# Patient Record
Sex: FEMALE | Race: BLACK OR AFRICAN AMERICAN | NOT HISPANIC OR LATINO | URBAN - METROPOLITAN AREA
[De-identification: names, ages, dates, MRNs, and addresses within clinical notes are randomized per-mention and may not be internally consistent; named-entity substitution may affect disease eponyms.]

---

## 2017-02-15 PROBLEM — Z00.00 ENCOUNTER FOR PREVENTIVE HEALTH EXAMINATION: Status: ACTIVE | Noted: 2017-02-15

## 2017-09-26 ENCOUNTER — EMERGENCY (EMERGENCY)
Facility: HOSPITAL | Age: 22
LOS: 1 days | Discharge: HOME | End: 2017-09-26
Admitting: EMERGENCY MEDICINE
Payer: COMMERCIAL

## 2017-09-26 VITALS
DIASTOLIC BLOOD PRESSURE: 82 MMHG | OXYGEN SATURATION: 98 % | SYSTOLIC BLOOD PRESSURE: 139 MMHG | TEMPERATURE: 98 F | RESPIRATION RATE: 18 BRPM | HEART RATE: 104 BPM

## 2017-09-26 DIAGNOSIS — R69 ILLNESS, UNSPECIFIED: ICD-10-CM

## 2017-09-26 DIAGNOSIS — F12.150 CANNABIS ABUSE WITH PSYCHOTIC DISORDER WITH DELUSIONS: ICD-10-CM

## 2017-09-26 LAB
ALBUMIN SERPL ELPH-MCNC: 4.6 G/DL — SIGNIFICANT CHANGE UP (ref 3.3–5)
ALP SERPL-CCNC: 56 U/L — SIGNIFICANT CHANGE UP (ref 40–120)
ALT FLD-CCNC: 11 U/L — SIGNIFICANT CHANGE UP (ref 4–33)
APAP SERPL-MCNC: < 15 UG/ML — LOW (ref 15–25)
AST SERPL-CCNC: 24 U/L — SIGNIFICANT CHANGE UP (ref 4–32)
BARBITURATES MEASUREMENT: NEGATIVE — SIGNIFICANT CHANGE UP
BASOPHILS # BLD AUTO: 0.02 K/UL — SIGNIFICANT CHANGE UP (ref 0–0.2)
BASOPHILS NFR BLD AUTO: 0.4 % — SIGNIFICANT CHANGE UP (ref 0–2)
BENZODIAZ SERPL-MCNC: NEGATIVE — SIGNIFICANT CHANGE UP
BILIRUB SERPL-MCNC: 1.1 MG/DL — SIGNIFICANT CHANGE UP (ref 0.2–1.2)
BUN SERPL-MCNC: 8 MG/DL — SIGNIFICANT CHANGE UP (ref 7–23)
CALCIUM SERPL-MCNC: 9.1 MG/DL — SIGNIFICANT CHANGE UP (ref 8.4–10.5)
CHLORIDE SERPL-SCNC: 100 MMOL/L — SIGNIFICANT CHANGE UP (ref 98–107)
CO2 SERPL-SCNC: 19 MMOL/L — LOW (ref 22–31)
CREAT SERPL-MCNC: 1.16 MG/DL — SIGNIFICANT CHANGE UP (ref 0.5–1.3)
EOSINOPHIL # BLD AUTO: 0.02 K/UL — SIGNIFICANT CHANGE UP (ref 0–0.5)
EOSINOPHIL NFR BLD AUTO: 0.4 % — SIGNIFICANT CHANGE UP (ref 0–6)
ETHANOL BLD-MCNC: < 10 MG/DL — SIGNIFICANT CHANGE UP
GLUCOSE SERPL-MCNC: 117 MG/DL — HIGH (ref 70–99)
HCG SERPL-ACNC: < 5 MIU/ML — SIGNIFICANT CHANGE UP
HCT VFR BLD CALC: 39.2 % — SIGNIFICANT CHANGE UP (ref 34.5–45)
HGB BLD-MCNC: 13.7 G/DL — SIGNIFICANT CHANGE UP (ref 11.5–15.5)
IMM GRANULOCYTES # BLD AUTO: 0.01 # — SIGNIFICANT CHANGE UP
IMM GRANULOCYTES NFR BLD AUTO: 0.2 % — SIGNIFICANT CHANGE UP (ref 0–1.5)
LYMPHOCYTES # BLD AUTO: 1.4 K/UL — SIGNIFICANT CHANGE UP (ref 1–3.3)
LYMPHOCYTES # BLD AUTO: 27.3 % — SIGNIFICANT CHANGE UP (ref 13–44)
MCHC RBC-ENTMCNC: 32.2 PG — SIGNIFICANT CHANGE UP (ref 27–34)
MCHC RBC-ENTMCNC: 34.9 % — SIGNIFICANT CHANGE UP (ref 32–36)
MCV RBC AUTO: 92.2 FL — SIGNIFICANT CHANGE UP (ref 80–100)
MONOCYTES # BLD AUTO: 0.54 K/UL — SIGNIFICANT CHANGE UP (ref 0–0.9)
MONOCYTES NFR BLD AUTO: 10.5 % — SIGNIFICANT CHANGE UP (ref 2–14)
NEUTROPHILS # BLD AUTO: 3.13 K/UL — SIGNIFICANT CHANGE UP (ref 1.8–7.4)
NEUTROPHILS NFR BLD AUTO: 61.2 % — SIGNIFICANT CHANGE UP (ref 43–77)
NRBC # FLD: 0 — SIGNIFICANT CHANGE UP
PLATELET # BLD AUTO: 262 K/UL — SIGNIFICANT CHANGE UP (ref 150–400)
PMV BLD: 9.3 FL — SIGNIFICANT CHANGE UP (ref 7–13)
POTASSIUM SERPL-MCNC: 3.2 MMOL/L — LOW (ref 3.5–5.3)
POTASSIUM SERPL-SCNC: 3.2 MMOL/L — LOW (ref 3.5–5.3)
PROT SERPL-MCNC: 7.1 G/DL — SIGNIFICANT CHANGE UP (ref 6–8.3)
RBC # BLD: 4.25 M/UL — SIGNIFICANT CHANGE UP (ref 3.8–5.2)
RBC # FLD: 12.7 % — SIGNIFICANT CHANGE UP (ref 10.3–14.5)
SALICYLATES SERPL-MCNC: < 5 MG/DL — LOW (ref 15–30)
SODIUM SERPL-SCNC: 140 MMOL/L — SIGNIFICANT CHANGE UP (ref 135–145)
TSH SERPL-MCNC: 0.6 UIU/ML — SIGNIFICANT CHANGE UP (ref 0.27–4.2)
WBC # BLD: 5.12 K/UL — SIGNIFICANT CHANGE UP (ref 3.8–10.5)
WBC # FLD AUTO: 5.12 K/UL — SIGNIFICANT CHANGE UP (ref 3.8–10.5)

## 2017-09-26 PROCEDURE — 70450 CT HEAD/BRAIN W/O DYE: CPT | Mod: 26

## 2017-09-26 PROCEDURE — 93010 ELECTROCARDIOGRAM REPORT: CPT | Mod: 59

## 2017-09-26 PROCEDURE — 99285 EMERGENCY DEPT VISIT HI MDM: CPT | Mod: 25

## 2017-09-26 RX ORDER — POTASSIUM CHLORIDE 20 MEQ
40 PACKET (EA) ORAL ONCE
Qty: 0 | Refills: 0 | Status: COMPLETED | OUTPATIENT
Start: 2017-09-26 | End: 2017-09-26

## 2017-09-26 RX ORDER — HALOPERIDOL DECANOATE 100 MG/ML
5 INJECTION INTRAMUSCULAR ONCE
Qty: 0 | Refills: 0 | Status: COMPLETED | OUTPATIENT
Start: 2017-09-26 | End: 2017-09-26

## 2017-09-26 RX ORDER — DIPHENHYDRAMINE HCL 50 MG
50 CAPSULE ORAL ONCE
Qty: 0 | Refills: 0 | Status: COMPLETED | OUTPATIENT
Start: 2017-09-26 | End: 2017-09-26

## 2017-09-26 RX ADMIN — Medication 2 MILLIGRAM(S): at 20:39

## 2017-09-26 RX ADMIN — HALOPERIDOL DECANOATE 5 MILLIGRAM(S): 100 INJECTION INTRAMUSCULAR at 20:39

## 2017-09-26 RX ADMIN — Medication 50 MILLIGRAM(S): at 20:39

## 2017-09-26 NOTE — ED BEHAVIORAL HEALTH ASSESSMENT NOTE - SUMMARY
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 Pt was extremely agitated and combative upon arrival and was medicated with Haldol 5mg, Ativan 2mg, and Benadryl 50mg IM STAT with good effect. Pt is now calm and cooperative. Pt denies smoking K2 and says "my parents are confused." She says "let me go. I want to go back to school." She denies mood or psychotic symptoms, including depressed mood, hopelessness, helplessness, racing thoughts, A/V hallucinations, delusions and paranoia. Pt also specifically and adamantly denies suicidal and homicidal ideations, intent or plan at this time. I met with Pt's mother and father who stated they went to see the Pt on the weekend at [place] and Pt was at [facility] for psych. Friend told parents that Pt was acting strange, quoting Bible, preaching that Rodney is coming, and saying she will be quitting school. So parents brought Pt back home to [place]. This morning, while they were driving back to [place], [place], Pt admitted to parents that she had smoked weed on Saturday, so parents made a U-turn and brought her to our ER. 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    Pt re-evaluated after IM medications and sufficient time in the ER. Pt states that over this past weekend she smoked some marijuana and was speaking to her friends about her Anglican (Jainism) in parables when they became concerned. Pt states her friends at school did not understand what she was doing, became concerned and called 911. Pt was taken to the [facility] and cleared. Pt's parents were notified by her friends and they came to pick her up. Pt's parents brought her home Monday. Pt states she stayed home Monday and Tuesday with plans to return to school Tuesday evening. While driving back to school, pt admitted to her father that she had smoked weed on Saturday and for that reason he brought her to the hospital. Upon arrival to the ER, pt admits to being combative and resistant as she did not want to be evaluated and felt her parents were being "ridiculous". Pt denies any s/s of Depression, yari, or psychosis. Denies suicidal or homicidal ideations, intent or plans. Denies auditory/visual hallucinations or delusions, none elicited on exam. Endorses adequate sleep and appetite. Pt states she is Yazidi her whole life and that her recent Yazidi conversations do not indicate a hyper-religiosity. Pt states she enjoys school and wants to work in psychiatry. Pt denies any regular substance use, states she only rarely will take 1-2 puffs of marijuana if others are smoking. Pt declined need for inpatient psychiatric treatment and there was no indication for involuntary admission at this time as pt is not an acute risk of harm to self/others. Pt accepted information regarding [facility] mental health clinic and states she will follow up if needed. Spoke with pt's parents and they agree with plan for discharge, have no acute safety concerns.

## 2017-09-26 NOTE — ED BEHAVIORAL HEALTH ASSESSMENT NOTE - CASE SUMMARY
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    Pt re-evaluated after IM medications and sufficient time in the ER. Pt states that over this past weekend she smoked some marijuana and was speaking to her friends about her Sabianism (Anabaptism) in parables when they became concerned. Pt states her friends at school did not understand what she was doing, became concerned and called 911. Pt was taken to the [facility] and cleared. Pt's parents were notified by her friends and they came to pick her up. Pt's parents brought her home Monday. Pt states she stayed home Monday and Tuesday with plans to return to school Tuesday evening. While driving back to school, pt admitted to her father that she had smoked weed on Saturday and for that reason he brought her to the hospital. Upon arrival to the ER, pt admits to being combative and resistant as she did not want to be evaluated and felt her parents were being "ridiculous". Pt denies any s/s of Depression, yari, or psychosis. Denies suicidal or homicidal ideations, intent or plans. Denies auditory/visual hallucinations or delusions, none elicited on exam. Endorses adequate sleep and appetite. 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 Pt states she enjoys school and wants to work in psychiatry. Pt denies any regular substance use, states she only rarely will take 1-2 puffs of marijuana if others are smoking. Pt declined need for inpatient psychiatric treatment and there was no indication for involuntary admission at this time as pt is not an acute risk of harm to self/others. Pt accepted information regarding [facility] mental health clinic and states she will follow up if needed. Spoke with pt's parents and they agree with plan for discharge, have no acute safety concerns.

## 2017-09-26 NOTE — ED PROVIDER NOTE - OBJECTIVE STATEMENT
This is a 22 year old Female No PMHX No PSH BIB family for psych eval. Patient was aggressive to triage staff required immediate intervention with Ativan 2mg/Haldol 5mg/Benadryl 50 mg IM x 1 and restraints. Patient was yelling, running, attempting to bite and push staff. Patient was stating " you may call me "your sugey of water" "You are not in my kingdom" " I will kill you in 5 minutes" "I can make it happen"

## 2017-09-26 NOTE — ED BEHAVIORAL HEALTH NOTE - BEHAVIORAL HEALTH NOTE
Patient is a 22 year female student brought in to the emergency room by her parents.  Writer met with pt's parents mother Yolette Solomon  father Monday Neha .  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  Patient's mother states she went to pick patient up yesterday because she received a call from patient's friend that patient was preaching.  Patient's parents state patient typically preaches, but this time it was "excessive" and "out of line".  Patient was reportedly saying "give your life to Herbie" and "Herbie is coming soon".  Patient slept well last night and her parents were driving her back to [place] today, but started yelling "Call ".  Patient's parents then decided to bring patient to the hospital.  Patient has no psychiatric history, no major medical problems, some asthma and penicillin allergy.  Patient has no medical admissions, no history of surgery.  Patient has no family history of mental illness.  They state patient reported to them smoking Marijuana on Saturday.

## 2017-09-26 NOTE — ED BEHAVIORAL HEALTH ASSESSMENT NOTE - HPI (INCLUDE ILLNESS QUALITY, SEVERITY, DURATION, TIMING, CONTEXT, MODIFYING FACTORS, ASSOCIATED SIGNS AND SYMPTOMS)
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 Pt was extremely agitated and combative upon arrival and was medicated with Haldol 5mg, Ativan 2mg, and Benadryl 50mg IM STAT with good effect. Pt is now calm and cooperative. Pt denies smoking K2 and says "my parents are confused." She says "let me go. I want to go back to school." She denies mood or psychotic symptoms, including depressed mood, hopelessness, helplessness, racing thoughts, A/V hallucinations, delusions and paranoia. Pt also specifically and adamantly denies suicidal and homicidal ideations, intent or plan at this time. I met with Pt's mother and father who stated they went to see the Pt on the weekend at [place] and Pt was at [facility] for psych. Friend told parents that Pt was acting strange, quoting Bible, preaching that Rodney is coming, and saying she will be quitting school. So parents brought Pt back home to [place]. This morning, while they were driving back to [place], [place], Pt admitted to parents that she had smoked weed on Saturday, so parents made a U-turn and brought her to our ER. 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 Pt was extremely agitated and combative upon arrival and was medicated with Haldol 5mg, Ativan 2mg, and Benadryl 50mg IM STAT. Pt denies smoking K2 and says "my parents are confused." She says "let me go. I want to go back to school." She denies mood or psychotic symptoms, including depressed mood, hopelessness, helplessness, racing thoughts, A/V hallucinations, delusions and paranoia. Pt also specifically and adamantly denies suicidal and homicidal ideations, intent or plan at this time. I met with Pt's mother and father who stated they went to see the Pt on the weekend at [place] and Pt was at [facility] for psych. Friend told parents that Pt was acting strange, quoting Bible, preaching that Rodney is coming, and saying she will be quitting school. So parents brought Pt back home to [place]. This morning, while they were driving back to [place], [place], Pt admitted to parents that she had smoked weed on Saturday, so parents made a U-turn and brought her to our ER. Father states he was aware that Pt has been smoking marijuana time to time but he has "never seen her like this before."    Pt to be re-evaluated s/p IM medications - see Summary

## 2017-09-26 NOTE — ED ADULT NURSE REASSESSMENT NOTE - NS ED NURSE REASSESS COMMENT FT1
Patient taken out of 4 point leather restraints at 17:30 once noted to be calm and sleeping, blood work and EKG done, pt currently in BH room 6, awaiting further MD evaluation, will continue to monitor pt.

## 2017-09-26 NOTE — ED BEHAVIORAL HEALTH ASSESSMENT NOTE - PRIMARY DX
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

## 2017-09-26 NOTE — ED ADULT TRIAGE NOTE - CHIEF COMPLAINT QUOTE
Patient BIB family for psychotic episode and agitation, pt unable to be triaged, pt became combative with triage nurse

## 2017-09-26 NOTE — ED BEHAVIORAL HEALTH NOTE - BEHAVIORAL HEALTH NOTE
This worker has met with the patient's family to notify them and provide visiting information for the [address] [place] [address].

## 2017-09-26 NOTE — ED BEHAVIORAL HEALTH ASSESSMENT NOTE - SUICIDE PROTECTIVE FACTORS
Supportive social network or family/Engaged in work or school/Identifies reasons for living/Responsibility to family and others

## 2017-09-26 NOTE — ED PROVIDER NOTE - CARE PLAN
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

## 2017-09-27 VITALS
HEART RATE: 74 BPM | RESPIRATION RATE: 16 BRPM | TEMPERATURE: 98 F | OXYGEN SATURATION: 100 % | SYSTOLIC BLOOD PRESSURE: 118 MMHG | DIASTOLIC BLOOD PRESSURE: 71 MMHG

## 2017-09-27 DIAGNOSIS — F12.950 CANNABIS USE, UNSPECIFIED WITH PSYCHOTIC DISORDER WITH DELUSIONS: ICD-10-CM

## 2017-09-27 DIAGNOSIS — F43.24 ADJUSTMENT DISORDER WITH DISTURBANCE OF CONDUCT: ICD-10-CM

## 2019-11-29 ENCOUNTER — INPATIENT (INPATIENT)
Facility: HOSPITAL | Age: 24
LOS: 18 days | Discharge: HOME | End: 2019-12-18
Attending: PSYCHIATRY & NEUROLOGY | Admitting: PSYCHIATRY & NEUROLOGY
Payer: COMMERCIAL

## 2019-11-29 VITALS
DIASTOLIC BLOOD PRESSURE: 83 MMHG | RESPIRATION RATE: 17 BRPM | OXYGEN SATURATION: 100 % | SYSTOLIC BLOOD PRESSURE: 154 MMHG | HEART RATE: 95 BPM | TEMPERATURE: 100 F

## 2019-11-29 DIAGNOSIS — F23 BRIEF PSYCHOTIC DISORDER: ICD-10-CM

## 2019-11-29 DIAGNOSIS — F29 UNSPECIFIED PSYCHOSIS NOT DUE TO A SUBSTANCE OR KNOWN PHYSIOLOGICAL CONDITION: ICD-10-CM

## 2019-11-29 LAB
ALBUMIN SERPL ELPH-MCNC: 4.8 G/DL — SIGNIFICANT CHANGE UP (ref 3.3–5)
ALP SERPL-CCNC: 71 U/L — SIGNIFICANT CHANGE UP (ref 40–120)
ALT FLD-CCNC: 12 U/L — SIGNIFICANT CHANGE UP (ref 4–33)
ANION GAP SERPL CALC-SCNC: 17 MMO/L — HIGH (ref 7–14)
APAP SERPL-MCNC: < 15 UG/ML — LOW (ref 15–25)
AST SERPL-CCNC: 24 U/L — SIGNIFICANT CHANGE UP (ref 4–32)
BASOPHILS # BLD AUTO: 0.03 K/UL — SIGNIFICANT CHANGE UP (ref 0–0.2)
BASOPHILS NFR BLD AUTO: 0.3 % — SIGNIFICANT CHANGE UP (ref 0–2)
BILIRUB SERPL-MCNC: 0.5 MG/DL — SIGNIFICANT CHANGE UP (ref 0.2–1.2)
BUN SERPL-MCNC: 5 MG/DL — LOW (ref 7–23)
CALCIUM SERPL-MCNC: 9.7 MG/DL — SIGNIFICANT CHANGE UP (ref 8.4–10.5)
CHLORIDE SERPL-SCNC: 105 MMOL/L — SIGNIFICANT CHANGE UP (ref 98–107)
CO2 SERPL-SCNC: 17 MMOL/L — LOW (ref 22–31)
CREAT SERPL-MCNC: 0.96 MG/DL — SIGNIFICANT CHANGE UP (ref 0.5–1.3)
EOSINOPHIL # BLD AUTO: 0.01 K/UL — SIGNIFICANT CHANGE UP (ref 0–0.5)
EOSINOPHIL NFR BLD AUTO: 0.1 % — SIGNIFICANT CHANGE UP (ref 0–6)
ETHANOL BLD-MCNC: < 10 MG/DL — SIGNIFICANT CHANGE UP
GLUCOSE SERPL-MCNC: 111 MG/DL — HIGH (ref 70–99)
HCG SERPL-ACNC: < 5 MIU/ML — SIGNIFICANT CHANGE UP
HCT VFR BLD CALC: 42.9 % — SIGNIFICANT CHANGE UP (ref 34.5–45)
HGB BLD-MCNC: 15 G/DL — SIGNIFICANT CHANGE UP (ref 11.5–15.5)
IMM GRANULOCYTES NFR BLD AUTO: 0.3 % — SIGNIFICANT CHANGE UP (ref 0–1.5)
LYMPHOCYTES # BLD AUTO: 1.07 K/UL — SIGNIFICANT CHANGE UP (ref 1–3.3)
LYMPHOCYTES # BLD AUTO: 10.9 % — LOW (ref 13–44)
MCHC RBC-ENTMCNC: 32.2 PG — SIGNIFICANT CHANGE UP (ref 27–34)
MCHC RBC-ENTMCNC: 35 % — SIGNIFICANT CHANGE UP (ref 32–36)
MCV RBC AUTO: 92.1 FL — SIGNIFICANT CHANGE UP (ref 80–100)
MONOCYTES # BLD AUTO: 0.84 K/UL — SIGNIFICANT CHANGE UP (ref 0–0.9)
MONOCYTES NFR BLD AUTO: 8.5 % — SIGNIFICANT CHANGE UP (ref 2–14)
NEUTROPHILS # BLD AUTO: 7.87 K/UL — HIGH (ref 1.8–7.4)
NEUTROPHILS NFR BLD AUTO: 79.9 % — HIGH (ref 43–77)
NRBC # FLD: 0 K/UL — SIGNIFICANT CHANGE UP (ref 0–0)
PLATELET # BLD AUTO: 317 K/UL — SIGNIFICANT CHANGE UP (ref 150–400)
PMV BLD: 10 FL — SIGNIFICANT CHANGE UP (ref 7–13)
POTASSIUM SERPL-MCNC: 3.9 MMOL/L — SIGNIFICANT CHANGE UP (ref 3.5–5.3)
POTASSIUM SERPL-SCNC: 3.9 MMOL/L — SIGNIFICANT CHANGE UP (ref 3.5–5.3)
PROT SERPL-MCNC: 8.7 G/DL — HIGH (ref 6–8.3)
RBC # BLD: 4.66 M/UL — SIGNIFICANT CHANGE UP (ref 3.8–5.2)
RBC # FLD: 11.9 % — SIGNIFICANT CHANGE UP (ref 10.3–14.5)
SALICYLATES SERPL-MCNC: < 5 MG/DL — LOW (ref 15–30)
SODIUM SERPL-SCNC: 139 MMOL/L — SIGNIFICANT CHANGE UP (ref 135–145)
TSH SERPL-MCNC: 0.76 UIU/ML — SIGNIFICANT CHANGE UP (ref 0.27–4.2)
WBC # BLD: 9.85 K/UL — SIGNIFICANT CHANGE UP (ref 3.8–10.5)
WBC # FLD AUTO: 9.85 K/UL — SIGNIFICANT CHANGE UP (ref 3.8–10.5)

## 2019-11-29 PROCEDURE — 99285 EMERGENCY DEPT VISIT HI MDM: CPT | Mod: GC

## 2019-11-29 RX ORDER — HALOPERIDOL DECANOATE 100 MG/ML
5 INJECTION INTRAMUSCULAR EVERY 8 HOURS
Refills: 0 | Status: DISCONTINUED | OUTPATIENT
Start: 2019-11-29 | End: 2019-12-18

## 2019-11-29 RX ORDER — HALOPERIDOL DECANOATE 100 MG/ML
5 INJECTION INTRAMUSCULAR ONCE
Refills: 0 | Status: DISCONTINUED | OUTPATIENT
Start: 2019-11-29 | End: 2019-12-18

## 2019-11-29 RX ORDER — RISPERIDONE 4 MG/1
2 TABLET ORAL AT BEDTIME
Refills: 0 | Status: DISCONTINUED | OUTPATIENT
Start: 2019-11-29 | End: 2019-12-02

## 2019-11-29 RX ORDER — DIPHENHYDRAMINE HCL 50 MG
50 CAPSULE ORAL ONCE
Refills: 0 | Status: COMPLETED | OUTPATIENT
Start: 2019-11-29 | End: 2019-11-29

## 2019-11-29 RX ORDER — HALOPERIDOL DECANOATE 100 MG/ML
5 INJECTION INTRAMUSCULAR ONCE
Refills: 0 | Status: COMPLETED | OUTPATIENT
Start: 2019-11-29 | End: 2019-11-29

## 2019-11-29 RX ADMIN — Medication 2 MILLIGRAM(S): at 08:09

## 2019-11-29 RX ADMIN — HALOPERIDOL DECANOATE 5 MILLIGRAM(S): 100 INJECTION INTRAMUSCULAR at 08:08

## 2019-11-29 RX ADMIN — RISPERIDONE 2 MILLIGRAM(S): 4 TABLET ORAL at 21:09

## 2019-11-29 RX ADMIN — Medication 50 MILLIGRAM(S): at 08:08

## 2019-11-29 NOTE — ED BEHAVIORAL HEALTH ASSESSMENT NOTE - SUMMARY
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 K2, seen at [facility] several times for psychosis/bizarre behavior/insomnia, currently prescribed Risperdal 2.5mg qhs by [facility] center, no prior inpatient hospitalizations, no SIB or past SAs, who presents to [facility] ED BIB parents for 5 days of bizarre behavior and 2 days of no sleep. Upon evaluation this AM, patient is disorganized, bizarre, and agitated at times. Interview is limited 2/2 thought disorganization. Patient received IM PRN Haldol 5mg, Ativan 2mg, Benadryl 50mg for agitation. 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 K2, seen at [facility] several times for psychosis/bizarre behavior/insomnia, currently prescribed Risperdal 2.5mg qhs by [facility] center, no prior inpatient hospitalizations, no SIB or past SAs, who presents to [facility] ED BIB parents for 5 days of bizarre behavior and 2 days of no sleep. Upon evaluation this AM, patient is disorganized, bizarre, and agitated at times. Interview is limited 2/2 thought disorganization. Patient received IM PRN Haldol 5mg, Ativan 2mg, Benadryl 50mg for agitation. Patient appears acutely decompensated and requires inpatient admission for safety and stabilization.

## 2019-11-29 NOTE — ED BEHAVIORAL HEALTH ASSESSMENT NOTE - CASE SUMMARY
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 K2, seen at [facility] center several times for psychosis/bizarre behavior/insomnia, currently prescribed Risperdal 2.5mg qhs by [facility] center, no prior inpatient hospitalizations, no SIB or past SAs, who presents to [facility] ED BIB parents for 5 days of bizarre behavior and 2 days of no sleep.     Patient presents as acutely decompensated with questionable medication compliance. 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 Similar presentation occurred in 2017 where she was admitted and currently requires inpatient admission for safety and stabilization.

## 2019-11-29 NOTE — ED ADULT NURSE REASSESSMENT NOTE - NS ED NURSE REASSESS COMMENT FT1
Pt received from main ER #TRA. Pt arrives highly disorganized; unable to participate in RN assessment questions and refusing bloodwork. MD made aware and will reassess.

## 2019-11-29 NOTE — ED PROVIDER NOTE - PHYSICAL EXAMINATION
Gen: Alert, intermittently hyperverbal and agitated  Head: NC, AT,  EOMI, normal lids/conjunctiva  ENT:  normal hearing, patent oropharynx without erythema/exudate, mildly dry mucous membranes  Neck: +supple, no tenderness/meningismus/JVD, +Trachea midline  Chest: no chest wall tenderness, equal chest rise  Pulm: Bilateral BS, normal resp effort, no wheeze/stridor/retractions  CV: RRR, no M/R/G, +dist pulses  Abd: +BS, soft, NT/ND  Rectal: deferred  Mskel: no edema/erythema/cyanosis  Skin: no rash  Neuro: AA, does not answer question of orientation, moves all extremities spontaneously and occasionally follows basic commands

## 2019-11-29 NOTE — ED ADULT NURSE NOTE - CHIEF COMPLAINT QUOTE
Pt [parents bring pt to [facility] reporting pt returned home from college three days ago for break and has not been able to sleep.  Pt has tried Benadryl and Nyquil PM with no success.  Pt has begun speaking bizarrely to parents making strange comments.  Pt denies S/I or H/I.  Pt father reports pt was asking him if he was her father.  Pt has hx of anxiety.  Pt family reports pt does not take any medications on a daily basis.  Pt was able to answer A&O questions correctly but it took her time to answer these questions.  RN asked pt if she smoked anything and she reported marijuana but parents denied this.  Pt sitting with eyes closed and will periodically say "I need more truth," and then repeating "it gets easier."  Pt family states pt is feeling CP.  EKG in triage.

## 2019-11-29 NOTE — ED ADULT NURSE REASSESSMENT NOTE - NS ED NURSE REASSESS COMMENT FT1
Pt lying on bed in nad vs as noted blanket provided emotional reassurance provided safety & comfort measures maintained eval on going.

## 2019-11-29 NOTE — ED BEHAVIORAL HEALTH ASSESSMENT NOTE - RISK ASSESSMENT
Low Acute Suicide Risk patient is acutely decompensated, at baseline is a [employer] student now unable to sleep or function, appears psychotic, mumbling to self, paranoid, distressed, requesting help and assistance and with questionable medication compliance.

## 2019-11-29 NOTE — ED ADULT NURSE REASSESSMENT NOTE - NS ED NURSE REASSESS COMMENT FT1
Pt reassessed by Psychiatric attending and medical attending and medicated as ordered. Pt remains highly disorganized with behavior including but not limited to attempting to elope from the unit, attempting to enter other pts rooms, laying on the floor and kissing floor tiles and rambling disorganized speech with varying tones.

## 2019-11-29 NOTE — ED ADULT NURSE NOTE - OBJECTIVE STATEMENT
Pt brought in by parents who reports she has not slept in 3 days. Unable to get clear story. Pt is staring into space and wont answer questions; other times she responds correctly, as she was able to tell me her full name. She is also rambling, not making sense. She appears to possibly be in a psychosis. Parents state the patient does not use and illegal drugs, and that she has no history of this before. Will continue to monitor

## 2019-11-29 NOTE — ED ADULT NURSE NOTE - INTERVENTIONS DEFINITIONS
Physically safe environment: no spills, clutter or unnecessary equipment/Stretcher in lowest position, wheels locked, appropriate side rails in place/Monitor for mental status changes and reorient to person, place, and time

## 2019-11-29 NOTE — ED BEHAVIORAL HEALTH ASSESSMENT NOTE - OTHER PAST PSYCHIATRIC HISTORY (INCLUDE DETAILS REGARDING ONSET, COURSE OF ILLNESS, INPATIENT/OUTPATIENT TREATMENT)
1 prior [facility] ED visit in 2017 for similar presentation. Seen by [facility], diagnosed with yari vs. panic attacks (?)  Currently prescribed Risperdal

## 2019-11-29 NOTE — ED ADULT NURSE REASSESSMENT NOTE - NS ED NURSE REASSESS COMMENT FT1
Evaluated and cleared by Psych / ER attending for admission pt calm disorganized  EMS activated for transport to [address]  safety & comfort measures maintained eval on going.

## 2019-11-29 NOTE — ED BEHAVIORAL HEALTH ASSESSMENT NOTE - FAMILY DETAILS
lives at college but currently at home with family lives at college in dorm by self but currently at home with family
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
## 2019-11-29 NOTE — ED ADULT NURSE REASSESSMENT NOTE - NS ED NURSE REASSESS COMMENT FT1
0830 am Received  report from night RN SS, pt lying on bed in nad eyes close breathing even & unlabored  safety & comfort measures maintained eval on going.

## 2019-11-29 NOTE — ED PROVIDER NOTE - CLINICAL SUMMARY MEDICAL DECISION MAKING FREE TEXT BOX
23yo F w/ PPH as above, bib parents for eval of bizarre behavior. DDx includes drug induced psychosis vs new diagnosis of underlying schizophrenia or yari/bipolar d/o. Pt became increasingly agitated and disorganized when sent to , required haldol/ativan/benadryl. Will get psych labs and BH eval. Discussed w/ psychiatrist.

## 2019-11-29 NOTE — ED ADULT TRIAGE NOTE - CHIEF COMPLAINT QUOTE
Pt [parents bring pt to [facility] reporting pt returned home from college three days ago for break and has not been able to sleep.  Pt has tried Benadryl and Nyquil PM with no success.  Pt has begun speaking bizarrely to parents making strange comments.  Pt denies S/I or H/I.  Pt father reports pt was asking him if he was her father.  Pt has hx of anxiety.  Pt family reports pt does not take any medications on a daily basis.  Pt was able to answer A&O questions correctly but it took her time to answer these questions.  RN asked pt if she smoked anything and she reported marijuana but parents denied this.  Pt sitting with eyes closed and will periodically say "I need more truth," and then repeating "it gets easier."  Pt family states pt is feeling CP.  EKG in triage.

## 2019-11-29 NOTE — ED BEHAVIORAL HEALTH ASSESSMENT NOTE - OTHER
unsure about level of compliance with Risperdal. unsure about recent cannabis use unable to assess appears to be internally preoccupied sharp decline from baseline [id number]

## 2019-11-29 NOTE — ED BEHAVIORAL HEALTH ASSESSMENT NOTE - SUICIDE PROTECTIVE FACTORS
Supportive social network of family or friends/Positive therapeutic relationships/Engaged in work or school

## 2019-11-29 NOTE — ED PROVIDER NOTE - OBJECTIVE STATEMENT
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 Per parents, pt returned home from school on Sunday, appeared to be "normal" but has not slept since then, making strange comments such as asking her father if he is in fact her father, and making statements such as "I need more truth" and "it gets easier, it's going to be ok". Pt reportedly endorsed smoking marijuana to triage nurse, but parents deny this and mother (pharmacist) states she tested pt for marijuana and test resulted negative. Pt denies physical complaints on my assessment, but is only intermittently answering questions. Does not answer questions about SI/HI/hallucinations.    Unable to obtain reliable ROS given pt's altered mental state.

## 2019-11-30 PROCEDURE — 99222 1ST HOSP IP/OBS MODERATE 55: CPT

## 2019-11-30 RX ORDER — EPINEPHRINE 0.3 MG/.3ML
0.3 INJECTION INTRAMUSCULAR; SUBCUTANEOUS ONCE
Refills: 0 | Status: COMPLETED | OUTPATIENT
Start: 2019-11-30 | End: 2019-11-30

## 2019-11-30 RX ORDER — BENZOCAINE AND MENTHOL 5; 1 G/100ML; G/100ML
1 LIQUID ORAL
Refills: 0 | Status: DISCONTINUED | OUTPATIENT
Start: 2019-11-30 | End: 2019-11-30

## 2019-11-30 RX ORDER — BENZTROPINE MESYLATE 1 MG
1 TABLET ORAL ONCE
Refills: 0 | Status: COMPLETED | OUTPATIENT
Start: 2019-11-30 | End: 2019-11-30

## 2019-11-30 RX ORDER — DIPHENHYDRAMINE HCL 50 MG
50 CAPSULE ORAL ONCE
Refills: 0 | Status: DISCONTINUED | OUTPATIENT
Start: 2019-11-30 | End: 2019-11-30

## 2019-11-30 RX ORDER — BENZTROPINE MESYLATE 1 MG
1 TABLET ORAL ONCE
Refills: 0 | Status: DISCONTINUED | OUTPATIENT
Start: 2019-11-30 | End: 2019-11-30

## 2019-11-30 RX ORDER — BENZOCAINE AND MENTHOL 5; 1 G/100ML; G/100ML
1 LIQUID ORAL
Refills: 0 | Status: DISCONTINUED | OUTPATIENT
Start: 2019-11-30 | End: 2019-12-18

## 2019-11-30 RX ORDER — ALBUTEROL 90 UG/1
2 AEROSOL, METERED ORAL EVERY 6 HOURS
Refills: 0 | Status: DISCONTINUED | OUTPATIENT
Start: 2019-11-30 | End: 2019-12-18

## 2019-11-30 RX ADMIN — EPINEPHRINE 0.3 MILLIGRAM(S): 0.3 INJECTION INTRAMUSCULAR; SUBCUTANEOUS at 13:48

## 2019-11-30 RX ADMIN — Medication 1 MILLIGRAM(S): at 16:29

## 2019-11-30 RX ADMIN — Medication 1 MILLIGRAM(S): at 15:41

## 2019-11-30 RX ADMIN — BENZOCAINE AND MENTHOL 1 LOZENGE: 5; 1 LIQUID ORAL at 13:11

## 2019-11-30 RX ADMIN — RISPERIDONE 2 MILLIGRAM(S): 4 TABLET ORAL at 22:40

## 2019-11-30 RX ADMIN — BENZOCAINE AND MENTHOL 1 LOZENGE: 5; 1 LIQUID ORAL at 10:14

## 2019-11-30 RX ADMIN — Medication 600 MILLIGRAM(S): at 17:19

## 2019-11-30 RX ADMIN — ALBUTEROL 2 PUFF(S): 90 AEROSOL, METERED ORAL at 12:44

## 2019-12-01 PROCEDURE — 99232 SBSQ HOSP IP/OBS MODERATE 35: CPT

## 2019-12-01 RX ORDER — RISPERIDONE 4 MG/1
0.5 TABLET ORAL ONCE
Refills: 0 | Status: COMPLETED | OUTPATIENT
Start: 2019-12-01 | End: 2019-12-01

## 2019-12-01 RX ORDER — BENZTROPINE MESYLATE 1 MG
1 TABLET ORAL AT BEDTIME
Refills: 0 | Status: DISCONTINUED | OUTPATIENT
Start: 2019-12-01 | End: 2019-12-03

## 2019-12-01 RX ADMIN — RISPERIDONE 0.5 MILLIGRAM(S): 4 TABLET ORAL at 20:41

## 2019-12-01 RX ADMIN — BENZOCAINE AND MENTHOL 1 LOZENGE: 5; 1 LIQUID ORAL at 16:52

## 2019-12-01 RX ADMIN — BENZOCAINE AND MENTHOL 1 LOZENGE: 5; 1 LIQUID ORAL at 21:20

## 2019-12-01 RX ADMIN — Medication 1 MILLIGRAM(S): at 20:41

## 2019-12-01 RX ADMIN — BENZOCAINE AND MENTHOL 1 LOZENGE: 5; 1 LIQUID ORAL at 14:36

## 2019-12-02 PROCEDURE — 99231 SBSQ HOSP IP/OBS SF/LOW 25: CPT | Mod: 25

## 2019-12-02 PROCEDURE — 90853 GROUP PSYCHOTHERAPY: CPT

## 2019-12-02 RX ORDER — RISPERIDONE 4 MG/1
1 TABLET ORAL AT BEDTIME
Refills: 0 | Status: DISCONTINUED | OUTPATIENT
Start: 2019-12-02 | End: 2019-12-03

## 2019-12-02 RX ADMIN — BENZOCAINE AND MENTHOL 1 LOZENGE: 5; 1 LIQUID ORAL at 08:27

## 2019-12-02 RX ADMIN — BENZOCAINE AND MENTHOL 1 LOZENGE: 5; 1 LIQUID ORAL at 17:00

## 2019-12-02 RX ADMIN — RISPERIDONE 1 MILLIGRAM(S): 4 TABLET ORAL at 20:48

## 2019-12-02 RX ADMIN — Medication 1 MILLIGRAM(S): at 20:48

## 2019-12-02 RX ADMIN — BENZOCAINE AND MENTHOL 1 LOZENGE: 5; 1 LIQUID ORAL at 21:19

## 2019-12-02 RX ADMIN — BENZOCAINE AND MENTHOL 1 LOZENGE: 5; 1 LIQUID ORAL at 06:25

## 2019-12-03 PROCEDURE — 99231 SBSQ HOSP IP/OBS SF/LOW 25: CPT

## 2019-12-03 PROCEDURE — 93010 ELECTROCARDIOGRAM REPORT: CPT

## 2019-12-03 RX ORDER — RISPERIDONE 4 MG/1
0.5 TABLET ORAL AT BEDTIME
Refills: 0 | Status: COMPLETED | OUTPATIENT
Start: 2019-12-03 | End: 2019-12-03

## 2019-12-03 RX ORDER — IBUPROFEN 200 MG
400 TABLET ORAL ONCE
Refills: 0 | Status: COMPLETED | OUTPATIENT
Start: 2019-12-03 | End: 2019-12-03

## 2019-12-03 RX ORDER — RISPERIDONE 4 MG/1
1 TABLET ORAL AT BEDTIME
Refills: 0 | Status: DISCONTINUED | OUTPATIENT
Start: 2019-12-04 | End: 2019-12-06

## 2019-12-03 RX ADMIN — RISPERIDONE 0.5 MILLIGRAM(S): 4 TABLET ORAL at 20:54

## 2019-12-03 RX ADMIN — BENZOCAINE AND MENTHOL 1 LOZENGE: 5; 1 LIQUID ORAL at 05:16

## 2019-12-03 RX ADMIN — Medication 400 MILLIGRAM(S): at 17:38

## 2019-12-03 RX ADMIN — BENZOCAINE AND MENTHOL 1 LOZENGE: 5; 1 LIQUID ORAL at 03:02

## 2019-12-04 PROCEDURE — 90853 GROUP PSYCHOTHERAPY: CPT

## 2019-12-04 PROCEDURE — 99231 SBSQ HOSP IP/OBS SF/LOW 25: CPT | Mod: 25

## 2019-12-04 RX ORDER — IBUPROFEN 200 MG
600 TABLET ORAL EVERY 6 HOURS
Refills: 0 | Status: DISCONTINUED | OUTPATIENT
Start: 2019-12-04 | End: 2019-12-10

## 2019-12-04 RX ADMIN — RISPERIDONE 1 MILLIGRAM(S): 4 TABLET ORAL at 20:12

## 2019-12-04 RX ADMIN — ALBUTEROL 2 PUFF(S): 90 AEROSOL, METERED ORAL at 03:53

## 2019-12-04 RX ADMIN — Medication 2 MILLIGRAM(S): at 13:43

## 2019-12-04 RX ADMIN — Medication 600 MILLIGRAM(S): at 17:47

## 2019-12-04 RX ADMIN — Medication 1 MILLIGRAM(S): at 05:30

## 2019-12-05 PROCEDURE — 99231 SBSQ HOSP IP/OBS SF/LOW 25: CPT

## 2019-12-05 RX ORDER — LITHIUM CARBONATE 300 MG/1
300 TABLET, EXTENDED RELEASE ORAL
Refills: 0 | Status: DISCONTINUED | OUTPATIENT
Start: 2019-12-05 | End: 2019-12-10

## 2019-12-05 RX ADMIN — BENZOCAINE AND MENTHOL 1 LOZENGE: 5; 1 LIQUID ORAL at 06:15

## 2019-12-05 RX ADMIN — BENZOCAINE AND MENTHOL 1 LOZENGE: 5; 1 LIQUID ORAL at 08:49

## 2019-12-05 RX ADMIN — RISPERIDONE 1 MILLIGRAM(S): 4 TABLET ORAL at 20:29

## 2019-12-05 RX ADMIN — BENZOCAINE AND MENTHOL 1 LOZENGE: 5; 1 LIQUID ORAL at 20:25

## 2019-12-05 RX ADMIN — Medication 1 MILLIGRAM(S): at 20:29

## 2019-12-05 RX ADMIN — BENZOCAINE AND MENTHOL 1 LOZENGE: 5; 1 LIQUID ORAL at 03:30

## 2019-12-05 RX ADMIN — Medication 2 MILLIGRAM(S): at 09:37

## 2019-12-06 PROCEDURE — 99231 SBSQ HOSP IP/OBS SF/LOW 25: CPT

## 2019-12-06 RX ORDER — RISPERIDONE 4 MG/1
2 TABLET ORAL AT BEDTIME
Refills: 0 | Status: DISCONTINUED | OUTPATIENT
Start: 2019-12-06 | End: 2019-12-09

## 2019-12-06 RX ADMIN — BENZOCAINE AND MENTHOL 1 LOZENGE: 5; 1 LIQUID ORAL at 20:57

## 2019-12-06 RX ADMIN — Medication 1 MILLIGRAM(S): at 20:57

## 2019-12-06 RX ADMIN — RISPERIDONE 2 MILLIGRAM(S): 4 TABLET ORAL at 20:57

## 2019-12-07 RX ADMIN — BENZOCAINE AND MENTHOL 1 LOZENGE: 5; 1 LIQUID ORAL at 20:29

## 2019-12-07 RX ADMIN — RISPERIDONE 2 MILLIGRAM(S): 4 TABLET ORAL at 20:29

## 2019-12-07 RX ADMIN — Medication 1 MILLIGRAM(S): at 20:30

## 2019-12-07 RX ADMIN — Medication 1 MILLIGRAM(S): at 09:43

## 2019-12-07 RX ADMIN — BENZOCAINE AND MENTHOL 1 LOZENGE: 5; 1 LIQUID ORAL at 09:40

## 2019-12-07 RX ADMIN — BENZOCAINE AND MENTHOL 1 LOZENGE: 5; 1 LIQUID ORAL at 06:01

## 2019-12-07 RX ADMIN — BENZOCAINE AND MENTHOL 1 LOZENGE: 5; 1 LIQUID ORAL at 22:10

## 2019-12-08 RX ADMIN — BENZOCAINE AND MENTHOL 1 LOZENGE: 5; 1 LIQUID ORAL at 08:36

## 2019-12-08 RX ADMIN — BENZOCAINE AND MENTHOL 1 LOZENGE: 5; 1 LIQUID ORAL at 06:19

## 2019-12-08 RX ADMIN — RISPERIDONE 2 MILLIGRAM(S): 4 TABLET ORAL at 20:11

## 2019-12-08 RX ADMIN — BENZOCAINE AND MENTHOL 1 LOZENGE: 5; 1 LIQUID ORAL at 20:14

## 2019-12-09 LAB
CHOLEST SERPL-MCNC: 127 MG/DL — SIGNIFICANT CHANGE UP (ref 120–199)
HBA1C BLD-MCNC: 4.9 % — SIGNIFICANT CHANGE UP (ref 4–5.6)
HDLC SERPL-MCNC: 55 MG/DL — SIGNIFICANT CHANGE UP (ref 45–65)
LIPID PNL WITH DIRECT LDL SERPL: 69 MG/DL — SIGNIFICANT CHANGE UP
TRIGL SERPL-MCNC: 60 MG/DL — SIGNIFICANT CHANGE UP (ref 10–149)

## 2019-12-09 PROCEDURE — 99231 SBSQ HOSP IP/OBS SF/LOW 25: CPT | Mod: 25

## 2019-12-09 PROCEDURE — 90853 GROUP PSYCHOTHERAPY: CPT

## 2019-12-09 RX ORDER — RISPERIDONE 4 MG/1
3 TABLET ORAL AT BEDTIME
Refills: 0 | Status: DISCONTINUED | OUTPATIENT
Start: 2019-12-09 | End: 2019-12-11

## 2019-12-09 RX ADMIN — LITHIUM CARBONATE 300 MILLIGRAM(S): 300 TABLET, EXTENDED RELEASE ORAL at 20:09

## 2019-12-09 RX ADMIN — Medication 0.5 MILLIGRAM(S): at 13:15

## 2019-12-09 RX ADMIN — BENZOCAINE AND MENTHOL 1 LOZENGE: 5; 1 LIQUID ORAL at 13:11

## 2019-12-09 RX ADMIN — BENZOCAINE AND MENTHOL 1 LOZENGE: 5; 1 LIQUID ORAL at 20:10

## 2019-12-10 PROCEDURE — 99231 SBSQ HOSP IP/OBS SF/LOW 25: CPT

## 2019-12-10 RX ORDER — LITHIUM CARBONATE 300 MG/1
300 TABLET, EXTENDED RELEASE ORAL DAILY
Refills: 0 | Status: DISCONTINUED | OUTPATIENT
Start: 2019-12-11 | End: 2019-12-18

## 2019-12-10 RX ORDER — LITHIUM CARBONATE 300 MG/1
600 TABLET, EXTENDED RELEASE ORAL AT BEDTIME
Refills: 0 | Status: DISCONTINUED | OUTPATIENT
Start: 2019-12-10 | End: 2019-12-16

## 2019-12-10 RX ADMIN — LITHIUM CARBONATE 600 MILLIGRAM(S): 300 TABLET, EXTENDED RELEASE ORAL at 20:48

## 2019-12-10 RX ADMIN — BENZOCAINE AND MENTHOL 1 LOZENGE: 5; 1 LIQUID ORAL at 10:31

## 2019-12-10 RX ADMIN — LITHIUM CARBONATE 300 MILLIGRAM(S): 300 TABLET, EXTENDED RELEASE ORAL at 09:32

## 2019-12-10 RX ADMIN — ALBUTEROL 2 PUFF(S): 90 AEROSOL, METERED ORAL at 10:31

## 2019-12-10 RX ADMIN — BENZOCAINE AND MENTHOL 1 LOZENGE: 5; 1 LIQUID ORAL at 06:38

## 2019-12-11 PROCEDURE — 99231 SBSQ HOSP IP/OBS SF/LOW 25: CPT | Mod: 25

## 2019-12-11 PROCEDURE — 90853 GROUP PSYCHOTHERAPY: CPT

## 2019-12-11 RX ORDER — RISPERIDONE 4 MG/1
1 TABLET ORAL AT BEDTIME
Refills: 0 | Status: DISCONTINUED | OUTPATIENT
Start: 2019-12-11 | End: 2019-12-12

## 2019-12-11 RX ADMIN — BENZOCAINE AND MENTHOL 1 LOZENGE: 5; 1 LIQUID ORAL at 05:15

## 2019-12-11 RX ADMIN — LITHIUM CARBONATE 300 MILLIGRAM(S): 300 TABLET, EXTENDED RELEASE ORAL at 08:11

## 2019-12-11 RX ADMIN — ALBUTEROL 2 PUFF(S): 90 AEROSOL, METERED ORAL at 02:30

## 2019-12-11 RX ADMIN — LITHIUM CARBONATE 600 MILLIGRAM(S): 300 TABLET, EXTENDED RELEASE ORAL at 20:44

## 2019-12-11 RX ADMIN — RISPERIDONE 1 MILLIGRAM(S): 4 TABLET ORAL at 20:44

## 2019-12-12 LAB — LITHIUM SERPL-MCNC: 0.25 MMOL/L — LOW (ref 0.6–1.2)

## 2019-12-12 PROCEDURE — 90832 PSYTX W PT 30 MINUTES: CPT | Mod: 59

## 2019-12-12 PROCEDURE — 99231 SBSQ HOSP IP/OBS SF/LOW 25: CPT | Mod: 25

## 2019-12-12 PROCEDURE — 90853 GROUP PSYCHOTHERAPY: CPT

## 2019-12-12 RX ORDER — RISPERIDONE 4 MG/1
2 TABLET ORAL AT BEDTIME
Refills: 0 | Status: DISCONTINUED | OUTPATIENT
Start: 2019-12-12 | End: 2019-12-18

## 2019-12-12 RX ADMIN — LITHIUM CARBONATE 300 MILLIGRAM(S): 300 TABLET, EXTENDED RELEASE ORAL at 09:14

## 2019-12-12 RX ADMIN — LITHIUM CARBONATE 600 MILLIGRAM(S): 300 TABLET, EXTENDED RELEASE ORAL at 20:20

## 2019-12-12 RX ADMIN — BENZOCAINE AND MENTHOL 1 LOZENGE: 5; 1 LIQUID ORAL at 17:44

## 2019-12-12 RX ADMIN — BENZOCAINE AND MENTHOL 1 LOZENGE: 5; 1 LIQUID ORAL at 06:01

## 2019-12-12 RX ADMIN — BENZOCAINE AND MENTHOL 1 LOZENGE: 5; 1 LIQUID ORAL at 20:19

## 2019-12-12 RX ADMIN — RISPERIDONE 2 MILLIGRAM(S): 4 TABLET ORAL at 20:20

## 2019-12-13 PROCEDURE — 99231 SBSQ HOSP IP/OBS SF/LOW 25: CPT

## 2019-12-13 RX ADMIN — LITHIUM CARBONATE 300 MILLIGRAM(S): 300 TABLET, EXTENDED RELEASE ORAL at 08:25

## 2019-12-13 RX ADMIN — LITHIUM CARBONATE 600 MILLIGRAM(S): 300 TABLET, EXTENDED RELEASE ORAL at 20:28

## 2019-12-13 RX ADMIN — BENZOCAINE AND MENTHOL 1 LOZENGE: 5; 1 LIQUID ORAL at 06:40

## 2019-12-13 RX ADMIN — RISPERIDONE 2 MILLIGRAM(S): 4 TABLET ORAL at 20:28

## 2019-12-13 RX ADMIN — BENZOCAINE AND MENTHOL 1 LOZENGE: 5; 1 LIQUID ORAL at 09:07

## 2019-12-14 RX ADMIN — Medication 600 MILLIGRAM(S): at 20:53

## 2019-12-14 RX ADMIN — LITHIUM CARBONATE 300 MILLIGRAM(S): 300 TABLET, EXTENDED RELEASE ORAL at 09:04

## 2019-12-14 RX ADMIN — BENZOCAINE AND MENTHOL 1 LOZENGE: 5; 1 LIQUID ORAL at 13:24

## 2019-12-14 RX ADMIN — RISPERIDONE 2 MILLIGRAM(S): 4 TABLET ORAL at 20:49

## 2019-12-14 RX ADMIN — BENZOCAINE AND MENTHOL 1 LOZENGE: 5; 1 LIQUID ORAL at 09:04

## 2019-12-14 RX ADMIN — BENZOCAINE AND MENTHOL 1 LOZENGE: 5; 1 LIQUID ORAL at 20:40

## 2019-12-14 RX ADMIN — LITHIUM CARBONATE 600 MILLIGRAM(S): 300 TABLET, EXTENDED RELEASE ORAL at 20:49

## 2019-12-15 RX ORDER — BENZOCAINE AND MENTHOL 5; 1 G/100ML; G/100ML
1 LIQUID ORAL
Refills: 0 | Status: DISCONTINUED | OUTPATIENT
Start: 2019-12-15 | End: 2019-12-18

## 2019-12-15 RX ADMIN — LITHIUM CARBONATE 600 MILLIGRAM(S): 300 TABLET, EXTENDED RELEASE ORAL at 22:39

## 2019-12-15 RX ADMIN — Medication 600 MILLIGRAM(S): at 16:58

## 2019-12-15 RX ADMIN — BENZOCAINE AND MENTHOL 1 LOZENGE: 5; 1 LIQUID ORAL at 22:38

## 2019-12-15 RX ADMIN — BENZOCAINE AND MENTHOL 1 LOZENGE: 5; 1 LIQUID ORAL at 21:31

## 2019-12-15 RX ADMIN — LITHIUM CARBONATE 300 MILLIGRAM(S): 300 TABLET, EXTENDED RELEASE ORAL at 09:09

## 2019-12-15 RX ADMIN — BENZOCAINE AND MENTHOL 1 LOZENGE: 5; 1 LIQUID ORAL at 09:09

## 2019-12-15 RX ADMIN — RISPERIDONE 2 MILLIGRAM(S): 4 TABLET ORAL at 22:39

## 2019-12-15 RX ADMIN — BENZOCAINE AND MENTHOL 1 LOZENGE: 5; 1 LIQUID ORAL at 06:46

## 2019-12-16 LAB — LITHIUM SERPL-MCNC: 0.53 MMOL/L — LOW (ref 0.6–1.2)

## 2019-12-16 PROCEDURE — 99231 SBSQ HOSP IP/OBS SF/LOW 25: CPT

## 2019-12-16 RX ORDER — LITHIUM CARBONATE 300 MG/1
900 TABLET, EXTENDED RELEASE ORAL AT BEDTIME
Refills: 0 | Status: DISCONTINUED | OUTPATIENT
Start: 2019-12-16 | End: 2019-12-18

## 2019-12-16 RX ADMIN — Medication 600 MILLIGRAM(S): at 20:54

## 2019-12-16 RX ADMIN — LITHIUM CARBONATE 300 MILLIGRAM(S): 300 TABLET, EXTENDED RELEASE ORAL at 08:43

## 2019-12-16 RX ADMIN — RISPERIDONE 2 MILLIGRAM(S): 4 TABLET ORAL at 21:41

## 2019-12-16 RX ADMIN — Medication 600 MILLIGRAM(S): at 08:44

## 2019-12-16 RX ADMIN — LITHIUM CARBONATE 900 MILLIGRAM(S): 300 TABLET, EXTENDED RELEASE ORAL at 21:41

## 2019-12-16 RX ADMIN — BENZOCAINE AND MENTHOL 1 LOZENGE: 5; 1 LIQUID ORAL at 08:43

## 2019-12-16 RX ADMIN — BENZOCAINE AND MENTHOL 1 LOZENGE: 5; 1 LIQUID ORAL at 20:53

## 2019-12-17 PROCEDURE — 99232 SBSQ HOSP IP/OBS MODERATE 35: CPT

## 2019-12-17 RX ADMIN — RISPERIDONE 2 MILLIGRAM(S): 4 TABLET ORAL at 21:23

## 2019-12-17 RX ADMIN — LITHIUM CARBONATE 900 MILLIGRAM(S): 300 TABLET, EXTENDED RELEASE ORAL at 21:23

## 2019-12-17 RX ADMIN — LITHIUM CARBONATE 300 MILLIGRAM(S): 300 TABLET, EXTENDED RELEASE ORAL at 08:53

## 2019-12-18 VITALS — TEMPERATURE: 98 F | SYSTOLIC BLOOD PRESSURE: 137 MMHG | DIASTOLIC BLOOD PRESSURE: 77 MMHG | HEART RATE: 91 BPM

## 2019-12-18 PROCEDURE — 99239 HOSP IP/OBS DSCHRG MGMT >30: CPT

## 2019-12-18 RX ORDER — LITHIUM CARBONATE 300 MG/1
4 TABLET, EXTENDED RELEASE ORAL
Qty: 120 | Refills: 0
Start: 2019-12-18 | End: 2020-01-16

## 2019-12-18 RX ORDER — RISPERIDONE 4 MG/1
1 TABLET ORAL
Qty: 30 | Refills: 0
Start: 2019-12-18 | End: 2020-01-16

## 2019-12-18 RX ORDER — ALBUTEROL 90 UG/1
2 AEROSOL, METERED ORAL
Qty: 0 | Refills: 0 | DISCHARGE
Start: 2019-12-18

## 2019-12-18 RX ADMIN — LITHIUM CARBONATE 300 MILLIGRAM(S): 300 TABLET, EXTENDED RELEASE ORAL at 08:30

## 2020-05-05 NOTE — ED PROVIDER NOTE - CCCP TRG CHIEF CMPLNT
psychiatric evaluation O-T Advancement Flap Text: The defect edges were debeveled with a #15 scalpel blade.  Given the location of the defect, shape of the defect and the proximity to free margins an O-T advancement flap was deemed most appropriate.  Using a sterile surgical marker, an appropriate advancement flap was drawn incorporating the defect and placing the expected incisions within the relaxed skin tension lines where possible.    The area thus outlined was incised deep to adipose tissue with a #15 scalpel blade.  The skin margins were undermined to an appropriate distance in all directions utilizing iris scissors.

## 2021-08-24 ENCOUNTER — RESULT REVIEW (OUTPATIENT)
Age: 26
End: 2021-08-24

## 2022-02-11 NOTE — ED ADULT TRIAGE NOTE - ESI TRIAGE ACUITY LEVEL, MLM
2 no hearing difficulty/no ear pain/no tinnitus/no vertigo/no sinus symptoms/no nasal congestion/no nasal discharge/no nose bleeds/no abnormal taste sensation/no throat pain/no dysphagia

## 2022-04-27 ENCOUNTER — APPOINTMENT (OUTPATIENT)
Dept: ORTHOPEDIC SURGERY | Facility: CLINIC | Age: 27
End: 2022-04-27
Payer: COMMERCIAL

## 2022-04-27 ENCOUNTER — NON-APPOINTMENT (OUTPATIENT)
Age: 27
End: 2022-04-27

## 2022-04-27 VITALS
HEART RATE: 73 BPM | HEIGHT: 63.78 IN | SYSTOLIC BLOOD PRESSURE: 122 MMHG | BODY MASS INDEX: 33.01 KG/M2 | DIASTOLIC BLOOD PRESSURE: 83 MMHG | WEIGHT: 191 LBS

## 2022-04-27 PROCEDURE — 99203 OFFICE O/P NEW LOW 30 MIN: CPT | Mod: 25

## 2022-04-27 PROCEDURE — 20550 NJX 1 TENDON SHEATH/LIGAMENT: CPT | Mod: RT

## 2022-04-27 RX ORDER — ASCORBIC ACID/VITAMIN E/BIOTIN 7.5MG-125
TABLET,CHEWABLE ORAL
Refills: 0 | Status: ACTIVE | COMMUNITY

## 2022-04-27 RX ORDER — METHYLPRED ACET/NACL,ISO-OS/PF 80 MG/ML
80 VIAL (ML) INJECTION
Qty: 1 | Refills: 0 | Status: COMPLETED | OUTPATIENT
Start: 2022-04-27

## 2022-04-27 RX ORDER — LIDOCAINE HYDROCHLORIDE 10 MG/ML
1 INJECTION, SOLUTION INFILTRATION; PERINEURAL
Refills: 0 | Status: COMPLETED | OUTPATIENT
Start: 2022-04-27

## 2022-04-27 RX ADMIN — METHYLPREDNISOLONE ACETATE 0.4 MG/ML: 80 INJECTION, SUSPENSION INTRA-ARTICULAR; INTRALESIONAL; INTRAMUSCULAR; SOFT TISSUE at 00:00

## 2022-04-27 RX ADMIN — LIDOCAINE HYDROCHLORIDE 0.75 %: 10 INJECTION, SOLUTION INFILTRATION; PERINEURAL at 00:00

## 2022-05-16 ENCOUNTER — APPOINTMENT (OUTPATIENT)
Dept: ORTHOPEDIC SURGERY | Facility: CLINIC | Age: 27
End: 2022-05-16
Payer: COMMERCIAL

## 2022-05-16 VITALS — SYSTOLIC BLOOD PRESSURE: 131 MMHG | DIASTOLIC BLOOD PRESSURE: 85 MMHG | HEART RATE: 77 BPM

## 2022-05-16 DIAGNOSIS — M65.4 RADIAL STYLOID TENOSYNOVITIS [DE QUERVAIN]: ICD-10-CM

## 2022-05-16 PROCEDURE — 99213 OFFICE O/P EST LOW 20 MIN: CPT

## 2022-06-01 NOTE — ED PROVIDER NOTE - PROGRESS NOTE DETAILS
Your tests here were very reassuring, with no signs of infection or strain on your heart or abnormal rhythm, or any dangerous cause of your symptoms.  However, as we discussed, it would be very reasonable to see cardiology again.  We have placed a referral for you, and you can use the contact information provided here to call to follow-up on that referral and schedule a visit.   patient signs out to Dr Campbell for dispo after  reevlation

## 2025-03-21 NOTE — ED BEHAVIORAL HEALTH ASSESSMENT NOTE - SUICIDE RISK FACTORS
Insomnia/Psychotic disorder current/past none Psychotic disorder current/past/Insomnia/Alcohol/Substance abuse disorders

## 2025-05-05 NOTE — ED BEHAVIORAL HEALTH ASSESSMENT NOTE - ADDITIONAL DETAILS ALL
Pt needs a refill pt is almost completely out he has 2 left   clopidogrel (Plavix) 75 mg tablet   [facility] [address] [place] [address] [facility]   Phone: [phone]   Fax: [phone]      no history of S/H/I/I/P